# Patient Record
Sex: MALE | Race: WHITE
[De-identification: names, ages, dates, MRNs, and addresses within clinical notes are randomized per-mention and may not be internally consistent; named-entity substitution may affect disease eponyms.]

---

## 2018-03-20 ENCOUNTER — HOSPITAL ENCOUNTER (EMERGENCY)
Dept: HOSPITAL 97 - ER | Age: 23
Discharge: HOME | End: 2018-03-20
Payer: COMMERCIAL

## 2018-03-20 DIAGNOSIS — G40.802: Primary | ICD-10-CM

## 2018-03-20 LAB
BUN BLD-MCNC: 14 MG/DL (ref 6–20)
GLUCOSE SERPLBLD-MCNC: 133 MG/DL (ref 65–120)
HCT VFR BLD CALC: 45.6 % (ref 39.6–49)
LYMPHOCYTES # SPEC AUTO: 3.2 K/UL (ref 0.7–4.9)
MCH RBC QN AUTO: 30.3 PG (ref 27–35)
MCV RBC: 91.4 FL (ref 80–100)
PMV BLD: 8.2 FL (ref 7.6–11.3)
POTASSIUM SERPL-SCNC: 3.7 MEQ/L (ref 3.6–5)
RBC # BLD: 4.99 M/UL (ref 4.33–5.43)

## 2018-03-20 PROCEDURE — 36415 COLL VENOUS BLD VENIPUNCTURE: CPT

## 2018-03-20 PROCEDURE — 85025 COMPLETE CBC W/AUTO DIFF WBC: CPT

## 2018-03-20 PROCEDURE — 99284 EMERGENCY DEPT VISIT MOD MDM: CPT

## 2018-03-20 PROCEDURE — 80048 BASIC METABOLIC PNL TOTAL CA: CPT

## 2018-03-20 PROCEDURE — 70450 CT HEAD/BRAIN W/O DYE: CPT

## 2018-03-20 PROCEDURE — 82962 GLUCOSE BLOOD TEST: CPT

## 2018-03-20 PROCEDURE — 96360 HYDRATION IV INFUSION INIT: CPT

## 2018-03-20 NOTE — RAD REPORT
EXAM DESCRIPTION:  CT - Head Brain Wo Cont - 3/20/2018 6:46 pm

 

CLINICAL HISTORY:   seizure

 

COMPARISON:  2016

 

TECHNIQUE:  Computed axial tomography of the head was obtained. IV contrast was not requested.

 

All CT scans are performed using dose optimization technique as appropriate and may include automated
 exposure control or mA/KV adjustment according to patient size.

 

FINDINGS:  An intracranial  bleed is not seen .

 

The ventricles are normal in caliber.

 

No extra-axial fluid collection is noted.

 

Fluid within the sinuses/ mastoids is not seen.

 

IMPRESSION:  No acute intracranial abnormality is seen. If patient's symptoms persist  MRI of the bra
in would be recommended.

## 2018-03-20 NOTE — ER
Nurse's Notes                                                                                     

 [facility]                                                                

Name: Randy Patel                                                                            

Age: 22 yrs                                                                                       

Sex: Male                                                                                         

: 1995                                                                                   

MRN: [id number]                                                                                   

Arrival Date: 2018                                                                          

Time: 18:21                                                                                       

Account#: [id number]                                                                            

Bed 6                                                                                             

Private MD:                                                                                       

Diagnosis: Epilepsy and recurrent seizures                                                        

                                                                                                  

Presentation:                                                                                     

                                                                                             

18:21 Presenting complaint: EMS states: Patient had witnessed seizure lasting approx 1 min    aj  

      prior to EMS arrival. EMS reports patient was post ictal upon arrival. Patient has HX       

      of epilepsy and reports that he is compliant with medications. Patient has not had a        

      seizure for 1 year. Transition of care: patient was not received from another setting       

      of care. Onset of symptoms was 2018. Care prior to arrival: IV initiated. 18      

      GA, in the right antecubital area, Glucose check: 124.                                      

18:21 Method Of Arrival: EMS: [facility]                                                 aj  

18:21 Acuity: RAN 3                                                                           aj  

                                                                                                  

Triage Assessment:                                                                                

18:24 General: Appears in no apparent distress. comfortable, Behavior is calm, cooperative,   aj  

      appropriate for age. Pain: Denies pain. Neuro: Level of Consciousness is awake, alert,      

      obeys commands, Oriented to person, place, time, situation, Seizure activity reported       

      prior to arrival. Seizure lasted approximately 1 minutes. Respiratory: Airway is patent     

      Respiratory effort is even, unlabored, Respiratory pattern is regular, symmetrical. GI:     

      Reports nausea. Derm: Skin is intact, is healthy with good turgor, Skin is pink, warm \T\   

      dry. normal.                                                                                

                                                                                                  

Historical:                                                                                       

- Allergies:                                                                                      

18:23 No Known Allergies;                                                                     aj  

- Home Meds:                                                                                      

18:23 Keppra 500 mg oral tab 2 times per day [Active];                                        aj  

- PMHx:                                                                                           

18:23 Seizures;                                                                               aj  

- PSHx:                                                                                           

18:23 None;                                                                                   aj  

                                                                                                  

- Immunization history:: Adult Immunizations up to date.                                          

- Social history:: Smoking status: Patient/guardian denies using tobacco.                         

- Family history:: not pertinent.                                                                 

- Hospitalizations: : No recent hospitalization is reported.                                      

                                                                                                  

                                                                                                  

Screenin:27 Abuse screen: Denies threats or abuse. Nutritional screening: No deficits noted.        ae1 

      Tuberculosis screening: No symptoms or risk factors identified. Fall Risk No fall in        

      past 12 months (0 pts). Secondary diagnosis (15 points) seizures, IV access (20             

      points). Ambulatory Aid- None/Bed Rest/Nurse Assist (0 pts). Gait- Normal/Bed               

      Rest/Wheelchair (0 pts) Mental Status- Oriented to own ability (0 pts).                     

                                                                                                  

Assessment:                                                                                       

18:27 General: Appears in no apparent distress. comfortable, slender, Behavior is calm,       ae1 

      cooperative. Pain: Complains of pain in head and back of head Pain currently is 8 out       

      of 10 on a pain scale. Neuro: Level of Consciousness is awake, alert, obeys commands,       

      Oriented to person, place, time, situation. Cardiovascular: Heart tones S1 S2 present       

      Patient's skin is warm and dry. Respiratory: Airway is patent Respiratory effort is         

      even, unlabored, Breath sounds are clear bilaterally. GI: small amount of vomit on chin     

      and front of patient's shirt. Patient currently denies diarrhea, nausea, EMS reports        

      patient vomited upon transfer onto ambulance. : No signs and/or symptoms were             

      reported regarding the genitourinary system. EENT: No signs and/or symptoms were            

      reported regarding the EENT system. Derm: Skin is pink, warm \T\ dry. Musculoskeletal: No   

      signs and/or symptoms reported regarding the musculoskeletal system.                        

18:40 Reassessment: Patient complaining of headache rating 8/10, provider notified, new       ae1 

      orders received.                                                                            

19:05 General: Appears in no apparent distress. comfortable, slender, Behavior is calm,       ar4 

      cooperative. Pain: Complains of pain in posterior side of head Pain does not radiate.       

      Pain currently is 2 out of 10 on a pain scale. Quality of pain is described as              

      pressure, throbbing, Alleviated by medications, Aggravated by increased activity,           

      repositioning, Noted to be resistant to movement, Also complains of. Neuro: Level of        

      Consciousness is awake, alert, obeys commands, Oriented to person, place, time,             

      situation,  are equal bilaterally Moves all extremities. Gait is steady, Speech is     

      normal, Facial symmetry appears normal, Pupils are PERRLA. Cardiovascular: Heart tones      

      S1 S2 present Capillary refill < 3 seconds is brisk in bilateral fingers toes Patient's     

      skin is warm and dry. Pulses are all present. Rhythm is sinus rhythm Chest pain is          

      denied. Respiratory: Airway is patent Respiratory effort is even, unlabored, Breath         

      sounds are clear bilaterally. GI: Abdomen is flat, non-distended, Bowel sounds present      

      X 4 quads. Abd is soft and non tender X 4 quads. : No signs and/or symptoms were          

      reported regarding the genitourinary system. EENT: No signs and/or symptoms were            

      reported regarding the EENT system. Derm: Skin is intact, is healthy with good turgor,      

      Skin is dry, Skin is pink, warm \T\ dry. Skin temperature is warm. Musculoskeletal: No      

      signs and/or symptoms reported regarding the musculoskeletal system. Circulation,           

      motion, and sensation intact.                                                               

19:11 Reassessment: Report and hand off care to RODRIGO Church.                                   ae1 

19:43 Reassessment: Patient appears in no apparent distress at this time. Patient and/or      tc3 

      family updated on plan of care and expected duration. Pain level reassessed. Patient is     

      alert, oriented x 3, equal unlabored respirations, skin warm/dry/pink. Patient states       

      feeling better. Patient states symptoms have improved.                                      

20:18 Reassessment: Patient appears in no apparent distress at this time. Patient and/or      tc3 

      family updated on plan of care and expected duration. Pain level reassessed. Patient is     

      alert, oriented x 3, equal unlabored respirations, skin warm/dry/pink. father with pt       

      Patient states feeling better. Patient states symptoms have improved.                       

                                                                                                  

Vital Signs:                                                                                      

18:24  / 70; Pulse 100; Resp 18; Temp 98.4; Pulse Ox 99% on R/A; Weight 63.5 kg; Height aj  

      5 ft. 5 in. (165.10 cm); Pain 0/10;                                                         

19:05  / 74 LA Supine (auto/); Pulse 73; Resp 20; Temp 98.7; Pulse Ox 100% on R/A; Pain ar4 

      2/10;                                                                                       

19:10  / 62; Pulse 77; Resp 16; Pulse Ox 100% on R/A;                                   ae1 

20:10  / 64; Pulse 76; Resp 18; Temp 98.5(O); Pulse Ox 99% ; Pain 0/10;                 tc3 

18:24 Body Mass Index 23.30 (63.50 kg, 165.10 cm)                                             aj  

                                                                                                  

[place] Coma Score:                                                                               

18:24 Eye Response: spontaneous(4). Verbal Response: oriented(5). Motor Response: obeys       aj  

      commands(6). Total: 15.                                                                     

                                                                                                  

ED Course:                                                                                        

18:21 Patient arrived in ED.                                                                  aj  

18:23 Triage completed.                                                                       aj  

18:24 Armaan Echevarria MD is Attending Physician.                                                rn  

18:24 Arm band placed on left wrist. Patient placed in an exam room, on a stretcher.          aj  

18:27 Yaw Turner, RN is Primary Nurse.                                                   ae1 

18:32 Seizure precautions initiated. Pulse ox on. NIBP on.                                    ae1 

20:17 IV discontinued, intact, bleeding controlled, No redness/swelling at site. Pressure     tc3 

      dressing applied, DC'd EMS IV to right AC.                                                  

20:18 No provider procedures requiring assistance completed.                                  tc3 

                                                                                                  

Administered Medications:                                                                         

18:39 Drug: NS 0.9% 1000 ml Route: IV; Rate: 1000 ml; Site: right antecubital;                ae1 

19:59 Follow up: IV Status: Completed infusion; IV Intake: 1000ml                             tc3 

18:57 Drug: Norco 5 mg-325 mg 1 tabs Route: PO;                                               ae1 

19:45 Follow up: Response: No adverse reaction; Pain is decreased                             tc3 

                                                                                                  

                                                                                                  

Point of Care Testing:                                                                            

      Blood Glucose:                                                                              

18:35 Blood Glucose: 102 mg/dL;                                                               ae1 

      Ranges:                                                                                     

                                                                                                  

Intake:                                                                                           

19:59 IV: 1000ml; Total: 1000ml.                                                              tc3 

                                                                                                  

Outcome:                                                                                          

19:45 Discharge ordered by MD.                                                                rn  

20:18 Discharged to home ambulatory, with family.                                             tc3 

20:18 Condition: improved                                                                         

20:18 Discharge instructions given to patient, family, Instructed on discharge instructions,      

      follow up and referral plans. Demonstrated understanding of instructions, follow-up         

      care, Prescriptions given X 0                                                               

20:24 Patient left the ED.                                                                    tc3 

                                                                                                  

Signatures:                                                                                       

Soledad Choi RN RN aj Nieto, Roman, MD MD rn Elliott, Andrea, RN                     RN   ae1                                                  

Lynnette Augustine RN                      RN   tc3                                                  

Sneha Woods                               ar4                                                  

                                                                                                  

Corrections: (The following items were deleted from the chart)                                    

18:25 18:21 Care prior to arrival: None. aj                                                   aj  

                                                                                                  

**************************************************************************************************

## 2020-01-20 ENCOUNTER — HOSPITAL ENCOUNTER (EMERGENCY)
Dept: HOSPITAL 97 - ER | Age: 25
Discharge: HOME | End: 2020-01-20
Payer: SELF-PAY

## 2020-01-20 DIAGNOSIS — G40.909: ICD-10-CM

## 2020-01-20 DIAGNOSIS — R51: ICD-10-CM

## 2020-01-20 DIAGNOSIS — J10.1: Primary | ICD-10-CM

## 2020-01-20 PROCEDURE — 99283 EMERGENCY DEPT VISIT LOW MDM: CPT

## 2020-01-20 PROCEDURE — 87804 INFLUENZA ASSAY W/OPTIC: CPT

## 2020-01-20 NOTE — ER
Nurse's Notes                                                                                     

 [facility]                                                                 

Name: Randy Patel                                                                            

Age: 24 yrs                                                                                       

Sex: Male                                                                                         

: 1995                                                                                   

MRN: [id number]                                                                                   

Arrival Date: 2020                                                                          

Time: 20:52                                                                                       

Account#: [id number]                                                                            

Bed 8                                                                                             

Private MD: George Jorge R                                                                       

Diagnosis: Influenza due to identified novel influenza A virus;Headache;Vomiting                  

                                                                                                  

Presentation:                                                                                     

                                                                                             

21:00 Presenting complaint: Patient states: Cough and congestion x 1 day. C/O headache and    ca1 

      sore throat, N/V/F. Htemp 101F. Took Tylenol PTA. Transition of care: patient was not       

      received from another setting of care. Onset of symptoms was 2020. Risk         

      Assessment: Do you want to hurt yourself or someone else? Patient reports no desire to      

      harm self or others. Initial Sepsis Screen: Does the patient meet any 2 criteria? No.       

      Patient's initial sepsis screen is negative. Does the patient have a suspected source       

      of infection? No. Patient's initial sepsis screen is negative. Care prior to arrival:       

      Medication(s) given: Tylenol.                                                               

21:00 Method Of Arrival: Ambulatory                                                           ca1 

21:00 Acuity: RAN 4                                                                           ca1 

                                                                                                  

Historical:                                                                                       

- Allergies:                                                                                      

21:02 No Known Allergies;                                                                     ca1 

- Home Meds:                                                                                      

21:02 Keppra 500 mg Oral tab 2 times per day [Active];                                        ca1 

- PMHx:                                                                                           

21:02 Seizures;                                                                               ca1 

- PSHx:                                                                                           

21:02 None;                                                                                   ca1 

                                                                                                  

- Immunization history:: Adult Immunizations up to date, Flu vaccine is not up to date.           

- Social history:: Smoking status: Reported history of juuling and/or vaping.                     

- Ebola Screening: : Patient negative for fever greater than or equal to 101.5 degrees            

  Fahrenheit, and additional compatible Ebola Virus Disease symptoms Patient denies               

  exposure to infectious person Patient denies travel to an Ebola-affected area in the            

  21 days before illness onset No symptoms or risks identified at this time.                      

- Family history:: not pertinent.                                                                 

                                                                                                  

                                                                                                  

Screenin:20 Abuse screen: Denies threats or abuse. Nutritional screening: No deficits noted.        ea  

      Tuberculosis screening: No symptoms or risk factors identified. Fall Risk None              

      identified.                                                                                 

                                                                                                  

Assessment:                                                                                       

21:20 General: Appears uncomfortable, Behavior is appropriate for age. Pain: Complains of     ea  

      pain in sore throat. Neuro: Level of Consciousness is awake, alert, obeys commands,         

      Oriented to person, place, time. Cardiovascular: Patient's skin is warm and dry.            

      Respiratory: Airway is patent Respiratory effort is even, unlabored, Respiratory            

      pattern is regular, symmetrical. Derm: Skin is pink, warm \T\ dry.                          

22:12 Reassessment: Patient and/or family updated on plan of care and expected duration. Pain ea  

      level reassessed. Patient is alert, oriented x 3, equal unlabored respirations, skin        

      warm/dry/pink. Discharge instruction given to patient, verbalized the understanding of      

      instruction, Pt left ED ambulatory accompanied by significant other.                        

                                                                                                  

Vital Signs:                                                                                      

21:02  / 80; Pulse 86; Resp 17 S; Temp 99.2(O); Pulse Ox 100% on R/A; Weight 68.04 kg   ca1 

      (R); Height 5 ft. 5 in. (165.10 cm) (R); Pain 10/10;                                        

21:02 Body Mass Index 24.96 (68.04 kg, 165.10 cm)                                             ca1 

                                                                                                  

ED Course:                                                                                        

20:52 Patient arrived in ED.                                                                  es  

20:52 George Jorge MD is Private Physician.                                                  es  

20:54 Glynn Dupont MD is Attending Physician.                                             danae 

21:02 Triage completed.                                                                       ca1 

21:02 Arm band placed on right wrist.                                                         ca1 

21:03 Patient has correct armband on for positive identification. Bed in low position. Call   ca1 

      light in reach. Side rails up X 1. Pulse ox on. NIBP on.                                    

21:07 Mehnaz Yang, RODRIGO is Primary Nurse.                                                    ea  

22:02 George Jorge MD is Referral Physician.                                                 danae 

22:12 No provider procedures requiring assistance completed. Patient did not have IV access   ea  

      during this emergency room visit.                                                           

                                                                                                  

Administered Medications:                                                                         

21:37 Drug: Ibuprofen 600 mg Route: PO;                                                       ea  

22:00 Follow up: Response: No adverse reaction                                                ea  

22:07 Drug: Tamiflu 75 mg Route: PO;                                                          ea  

22:15 Follow up: Response: No adverse reaction                                                ea  

22:08 Drug: Zofran 4 mg Route: PO;                                                            ea  

22:15 Follow up: Response: No adverse reaction                                                ea  

22:08 Not Given (Other Intervention Used; pt took 1 gram prior to arrival): Tylenol 650 mg PO ea  

      once                                                                                        

                                                                                                  

                                                                                                  

Outcome:                                                                                          

22:03 Discharge ordered by MD.                                                                danae 

22:12 Discharged to home ambulatory, with family.                                             ea  

22:12 Condition: stable                                                                           

22:12 Discharge instructions given to patient, Instructed on discharge instructions,              

      Demonstrated understanding of instructions, follow-up care, Prescriptions given X 2.        

22:14 Patient left the ED.                                                                    ea  

                                                                                                  

Signatures:                                                                                       

Glynn Dupont MD MD cha Salyer, Edna es Antunez, Elena, RN                      RN   Sofya Craig RN                        RN   ca1                                                  

                                                                                                  

**************************************************************************************************

## 2020-01-20 NOTE — EDPHYS
Physician Documentation                                                                           

 [facility]                                                                 

Name: Randy Patel                                                                            

Age: 24 yrs                                                                                       

Sex: Male                                                                                         

: 1995                                                                                   

MRN: [id number]                                                                                   

Arrival Date: 2020                                                                          

Time: 20:52                                                                                       

Account#: [id number]                                                                            

Bed 8                                                                                             

Private MD: George Jorge R ED Physician Glynn Dupont                                                                      

HPI:                                                                                              

                                                                                             

21:53 This 24 yrs old  Male presents to ER via Ambulatory with complaints of Flu     danae 

      Symptoms.                                                                                   

21:53 The patient presents to the emergency department with nausea, vomiting, that is         danae 

      intermittent. Onset: The symptoms/episode began/occurred. Possible causes: flu. The         

      symptoms are aggravated by nothing. The patient or guardian reports cough, flu              

      symptoms, arthralgias, low-grade fever, myalgias. Onset: The symptoms/episode               

      began/occurred 2 day(s) ago. Severity of symptoms: At their worst the symptoms were         

      mild, moderate, in the emergency department the symptoms are unchanged.                     

                                                                                                  

Historical:                                                                                       

- Allergies:                                                                                      

21:02 No Known Allergies;                                                                     ca1 

- Home Meds:                                                                                      

21:02 Keppra 500 mg Oral tab 2 times per day [Active];                                        ca1 

- PMHx:                                                                                           

21:02 Seizures;                                                                               ca1 

- PSHx:                                                                                           

21:02 None;                                                                                   ca1 

                                                                                                  

- Immunization history:: Adult Immunizations up to date, Flu vaccine is not up to date.           

- Social history:: Smoking status: Reported history of juuling and/or vaping.                     

- Ebola Screening: : Patient negative for fever greater than or equal to 101.5 degrees            

  Fahrenheit, and additional compatible Ebola Virus Disease symptoms Patient denies               

  exposure to infectious person Patient denies travel to an Ebola-affected area in the            

  21 days before illness onset No symptoms or risks identified at this time.                      

- Family history:: not pertinent.                                                                 

                                                                                                  

                                                                                                  

ROS:                                                                                              

21:53 Constitutional: Negative for fever, chills, and weight loss, Eyes: Negative for injury, danae 

      pain, redness, and discharge, ENT: Negative for injury, pain, and discharge, Neck:          

      Negative for injury, pain, and swelling, Cardiovascular: Negative for chest pain,           

      palpitations, and edema, Abdomen/GI: Negative for abdominal pain, nausea, vomiting,         

      diarrhea, and constipation, Back: Negative for injury and pain, : Negative for            

      injury, bleeding, discharge, and swelling, MS/Extremity: Negative for injury and            

      deformity, Skin: Negative for injury, rash, and discoloration, Psych: Negative for          

      depression, anxiety, suicide ideation, homicidal ideation, and hallucinations,              

      Allergy/Immunology: Negative for hives, rash, and allergies, Endocrine: Negative for        

      neck swelling, polydipsia, polyuria, polyphagia, and marked weight changes,                 

      Hematologic/Lymphatic: Negative for swollen nodes, abnormal bleeding, and unusual           

      bruising.                                                                                   

21:53 Respiratory: Positive for cough.                                                            

21:53 Neuro: Positive for headache.                                                               

                                                                                                  

Exam:                                                                                             

21:53 Constitutional:  This is a well developed, well nourished patient who is awake, alert,  danae 

      and in no acute distress. Head/Face:  Normocephalic, atraumatic. Eyes:  Pupils equal        

      round and reactive to light, extra-ocular motions intact.  Lids and lashes normal.          

      Conjunctiva and sclera are non-icteric and not injected.  Cornea within normal limits.      

      Periorbital areas with no swelling, redness, or edema. ENT:  Nares patent. No nasal         

      discharge, no septal abnormalities noted.  Tympanic membranes are normal and external       

      auditory canals are clear.  Oropharynx with no redness, swelling, or masses, exudates,      

      or evidence of obstruction, uvula midline.  Mucous membranes moist. Neck:  Trachea          

      midline, no thyromegaly or masses palpated, and no cervical lymphadenopathy.  Supple,       

      full range of motion without nuchal rigidity, or vertebral point tenderness.  No            

      Meningismus. Chest/axilla:  Normal chest wall appearance and motion.  Nontender with no     

      deformity.  No lesions are appreciated. Cardiovascular:  Regular rate and rhythm with a     

      normal S1 and S2.  No gallops, murmurs, or rubs.  Normal PMI, no JVD.  No pulse             

      deficits. Abdomen/GI:  Soft, non-tender, with normal bowel sounds.  No distension or        

      tympany.  No guarding or rebound.  No evidence of tenderness throughout. Back:  No          

      spinal tenderness.  No costovertebral tenderness.  Full range of motion. Male :           

      Normal genitalia with no discharge or lesions. Skin:  Warm, dry with normal turgor.         

      Normal color with no rashes, no lesions, and no evidence of cellulitis. MS/ Extremity:      

      Pulses equal, no cyanosis.  Neurovascular intact.  Full, normal range of motion. Psych:     

       Awake, alert, with orientation to person, place and time.  Behavior, mood, and affect      

      are within normal limits.                                                                   

21:53 Neck: ROM/movement: is normal, no acute changes, Meningeal signs: are not present,          

      Kernig's sign is negative, Brudzinski's sign is negative.                                   

21:53 Respiratory: the patient does not display signs of respiratory distress,  Respirations:     

      normal, Breath sounds: are clear throughout.                                                

21:53 Abdomen/GI: Exam negative for acute changes, Inspection: abdomen appears normal, Bowel      

      sounds: normal, Palpation: abdomen is soft and non-tender, Liver: no appreciated            

      palpable abnormalities, Hernia: not appreciated.                                            

                                                                                                  

Vital Signs:                                                                                      

21:02  / 80; Pulse 86; Resp 17 S; Temp 99.2(O); Pulse Ox 100% on R/A; Weight 68.04 kg   ca1 

      (R); Height 5 ft. 5 in. (165.10 cm) (R); Pain 10/10;                                        

21:02 Body Mass Index 24.96 (68.04 kg, 165.10 cm)                                             ca1 

                                                                                                  

MDM:                                                                                              

20:54 Patient medically screened.                                                             [facility] 

22:01 Data reviewed: vital signs, nurses notes, lab test result(s), Flu: positive.            [facility] 

                                                                                                  

                                                                                             

20:55 Order name: Flu; Complete Time: 21:52                                                   [facility] 

                                                                                                  

Administered Medications:                                                                         

21:37 Drug: Ibuprofen 600 mg Route: PO;                                                       ea  

22:00 Follow up: Response: No adverse reaction                                                ea  

22:07 Drug: Tamiflu 75 mg Route: PO;                                                          ea  

22:15 Follow up: Response: No adverse reaction                                                ea  

22:08 Drug: Zofran 4 mg Route: PO;                                                            ea  

22:15 Follow up: Response: No adverse reaction                                                ea  

22:08 Not Given (Other Intervention Used; pt took 1 gram prior to arrival): Tylenol 650 mg PO ea  

      once                                                                                        

                                                                                                  

                                                                                                  

Disposition:                                                                                      

20 22:03 Discharged to Home. Impression: Influenza due to identified novel influenza A      

  virus, Headache, Vomiting.                                                                      

- Condition is Stable.                                                                            

- Discharge Instructions: Fever, Adult, General Headache Without Cause, Influenza,                

  Adult, Nausea and Vomiting, Adult, Nausea and Vomiting, Adult, Easy-to-Read, General            

  Headache Without Cause, Easy-to-Read.                                                           

- Prescriptions for Zofran 4 mg Oral Tablet - take 1 tablet by ORAL route every 12                

  hours As needed; 14 tablet. Tamiflu 75 mg Oral Capsule - take 1 tablet by ORAL route            

  every 12 hours for 5 days; 10 tablet.                                                           

- Work release form, Medication Reconciliation Form, Thank You Letter, Antibiotic                 

  Education, Prescription Opioid Use form.                                                        

- Follow up: George Jorge MD; When: 2 - 3 days; Reason: Recheck today's complaints,              

  Continuance of care, Re-evaluation by your physician.                                           

- Problem is new.                                                                                 

- Symptoms have improved.                                                                         

                                                                                                  

                                                                                                  

                                                                                                  

Signatures:                                                                                       

Dispatcher [employer]                           EDMS                                                 

Glynn Dupont MD MD cha Antunez, Elena, RN RN ea Acob, Cheryl, RN RN   ca1                                                  

                                                                                                  

Corrections: (The following items were deleted from the chart)                                    

22:14 22:03 2020 22:03 Discharged to Home. Impression: Influenza due to identified      ea  

      novel influenza A virus; Headache; Vomiting. Condition is Stable. Forms are Medication      

      Reconciliation Form, Thank You Letter, Antibiotic Education, Prescription Opioid Use.       

      Follow up: George Jorge; When: 2 - 3 days; Reason: Recheck today's complaints,               

      Continuance of care, Re-evaluation by your physician. Problem is new. Symptoms have         

      improved. danae                                                                               

                                                                                                  

**************************************************************************************************

## 2020-01-21 VITALS — SYSTOLIC BLOOD PRESSURE: 123 MMHG | TEMPERATURE: 99.2 F | OXYGEN SATURATION: 100 % | DIASTOLIC BLOOD PRESSURE: 80 MMHG
